# Patient Record
Sex: FEMALE | Race: WHITE | NOT HISPANIC OR LATINO | URBAN - METROPOLITAN AREA
[De-identification: names, ages, dates, MRNs, and addresses within clinical notes are randomized per-mention and may not be internally consistent; named-entity substitution may affect disease eponyms.]

---

## 2023-04-04 ENCOUNTER — APPOINTMENT (OUTPATIENT)
Dept: URBAN - METROPOLITAN AREA CLINIC 204 | Age: 78
Setting detail: DERMATOLOGY
End: 2023-04-04

## 2023-04-04 DIAGNOSIS — L82.1 OTHER SEBORRHEIC KERATOSIS: ICD-10-CM

## 2023-04-04 DIAGNOSIS — D18.0 HEMANGIOMA: ICD-10-CM

## 2023-04-04 PROBLEM — D23.39 OTHER BENIGN NEOPLASM OF SKIN OF OTHER PARTS OF FACE: Status: ACTIVE | Noted: 2023-04-04

## 2023-04-04 PROBLEM — D23.71 OTHER BENIGN NEOPLASM OF SKIN OF RIGHT LOWER LIMB, INCLUDING HIP: Status: ACTIVE | Noted: 2023-04-04

## 2023-04-04 PROBLEM — D23.72 OTHER BENIGN NEOPLASM OF SKIN OF LEFT LOWER LIMB, INCLUDING HIP: Status: ACTIVE | Noted: 2023-04-04

## 2023-04-04 PROBLEM — D23.62 OTHER BENIGN NEOPLASM OF SKIN OF LEFT UPPER LIMB, INCLUDING SHOULDER: Status: ACTIVE | Noted: 2023-04-04

## 2023-04-04 PROBLEM — D23.5 OTHER BENIGN NEOPLASM OF SKIN OF TRUNK: Status: ACTIVE | Noted: 2023-04-04

## 2023-04-04 PROBLEM — D18.01 HEMANGIOMA OF SKIN AND SUBCUTANEOUS TISSUE: Status: ACTIVE | Noted: 2023-04-04

## 2023-04-04 PROBLEM — D23.61 OTHER BENIGN NEOPLASM OF SKIN OF RIGHT UPPER LIMB, INCLUDING SHOULDER: Status: ACTIVE | Noted: 2023-04-04

## 2023-04-04 PROCEDURE — OTHER MIPS QUALITY: OTHER

## 2023-04-04 PROCEDURE — OTHER COUNSELING: OTHER

## 2023-04-04 PROCEDURE — 99203 OFFICE O/P NEW LOW 30 MIN: CPT

## 2023-04-04 ASSESSMENT — LOCATION DETAILED DESCRIPTION DERM
LOCATION DETAILED: LEFT INFERIOR UPPER BACK
LOCATION DETAILED: RIGHT MEDIAL SUPERIOR CHEST
LOCATION DETAILED: LEFT SUPERIOR MEDIAL MIDBACK
LOCATION DETAILED: EPIGASTRIC SKIN
LOCATION DETAILED: RIGHT ANTERIOR PROXIMAL UPPER ARM

## 2023-04-04 ASSESSMENT — LOCATION ZONE DERM
LOCATION ZONE: ARM
LOCATION ZONE: TRUNK

## 2023-04-04 ASSESSMENT — LOCATION SIMPLE DESCRIPTION DERM
LOCATION SIMPLE: LEFT LOWER BACK
LOCATION SIMPLE: RIGHT UPPER ARM
LOCATION SIMPLE: ABDOMEN
LOCATION SIMPLE: CHEST
LOCATION SIMPLE: LEFT UPPER BACK

## 2023-04-04 NOTE — PROCEDURE: MIPS QUALITY
Quality 110: Preventive Care And Screening: Influenza Immunization: Influenza Immunization not Administered because Patient Refused.
Quality 47: Advance Care Plan: Advance Care Planning discussed and documented; advance care plan or surrogate decision maker documented in the medical record.
Quality 111:Pneumonia Vaccination Status For Older Adults: Pneumococcal vaccine (PPSV23) was not administered on or after patient’s 60th birthday and before the end of the measurement period, reason not otherwise specified
Quality 226: Preventive Care And Screening: Tobacco Use: Screening And Cessation Intervention: Patient screened for tobacco use and is an ex/non-smoker
Detail Level: Detailed
Quality 130: Documentation Of Current Medications In The Medical Record: Current Medications Documented

## 2024-12-23 ENCOUNTER — OFFICE VISIT (OUTPATIENT)
Dept: URGENT CARE | Facility: CLINIC | Age: 79
End: 2024-12-23
Payer: MEDICARE

## 2024-12-23 VITALS
SYSTOLIC BLOOD PRESSURE: 135 MMHG | OXYGEN SATURATION: 96 % | DIASTOLIC BLOOD PRESSURE: 84 MMHG | HEART RATE: 104 BPM | RESPIRATION RATE: 12 BRPM | TEMPERATURE: 97.9 F

## 2024-12-23 DIAGNOSIS — J06.9 ACUTE URI: Primary | ICD-10-CM

## 2024-12-23 DIAGNOSIS — J18.0 BRONCHOPNEUMONIA: ICD-10-CM

## 2024-12-23 LAB
POC CORONAVIRUS 2019 BY PCR (COV19): NOT DETECTED
POC FLU A RESULT: NOT DETECTED
POC FLU B RESULT: NOT DETECTED
POC RSV PCR: NOT DETECTED

## 2024-12-23 PROCEDURE — 99212 OFFICE O/P EST SF 10 MIN: CPT | Performed by: PHYSICIAN ASSISTANT

## 2024-12-23 RX ORDER — AZITHROMYCIN 250 MG/1
TABLET, FILM COATED ORAL
Qty: 6 TABLET | Refills: 0 | Status: SHIPPED | OUTPATIENT
Start: 2024-12-23 | End: 2024-12-28

## 2024-12-23 ASSESSMENT — VISUAL ACUITY: OU: 1

## 2024-12-23 NOTE — PROGRESS NOTES
[facility] URGENT CARE   MENDEZ NOTE:      Name: Berna Caputo, 79 y.o.    CSN:[id number]   MRN:[id number]    PCP: No primary care provider on file.    ALL:    Allergies   Allergen Reactions    Doxycycline Other     Other reaction(s): Other (See Comments)   Stops normal function of bladder    Stops normal function of bladder    Levofloxacin Other     Other reaction(s): Other (See Comments), Other: See Comments   Tendon pain   Tendon pain   Other reaction(s): Other: See Comments   Tendon pain    Tendon pain   Other reaction(s): Other: See Comments   Tendon pain    Tendon pain    Amoxicillin-Pot Clavulanate Other     Other reaction(s): Other (See Comments), Other: See Comments   Inablility to urinate   Inablility to urinate      Other reaction(s): Other: See Comments   Inablility to urinate    Inablility to urinate      Other reaction(s): Other: See Comments   Inablility to urinate    Inablility to urinate    Azithromycin Other and Palpitations     Other reaction(s): Other: See Comments   Other reaction(s): Other: See Comments    Other reaction(s): Other: See Comments       History:    Chief Complaint: Cough and chest congestion (X 1 week)    Encounter Date: 12/23/2024      HPI: The history was obtained from the patient. Berna is a 79 y.o. female, who presents with a chief complaint of Cough and chest congestion (X 1 week) mentions she went out with her 9-year-old sister shopping last Monday and since then has had symptoms of congestion coughing generalized malaise and fatigue.  Denies any notable chest pain, increased work of breathing, diarrhea vomiting, or abdominal discomfort.    PMHx:    Past Medical History:   Diagnosis Date    Common variable immunodeficiency     Long-term current use of intravenous immunoglobulin (IVIG)               Current Outpatient Medications   Medication Sig Dispense Refill    azithromycin (Zithromax) 250 mg tablet Take 2 tablets (500 mg) on  Day 1,  followed by 1  tablet (250 mg) once daily on Days 2 through 5. 6 tablet 0     No current facility-administered medications for this visit.         PMSx:    Past Surgical History:   Procedure Laterality Date    BLADDER REPAIR      HYSTERECTOMY      TOTAL KNEE ARTHROPLASTY         Fam Hx: No family history on file.    SOC. Hx:     Social History     Socioeconomic History    Marital status: Single     Spouse name: Not on file    Number of children: Not on file    Years of education: Not on file    Highest education level: Not on file   Occupational History    Not on file   Tobacco Use    Smoking status: Never    Smokeless tobacco: Never   Vaping Use    Vaping status: Never Used   Substance and Sexual Activity    Alcohol use: Not Currently    Drug use: Never    Sexual activity: Not Currently   Other Topics Concern    Not on file   Social History Narrative    Not on file     Social Drivers of Health     Financial Resource Strain: Low Risk  (12/4/2024)    Received from [facility]    Overall Financial Resource Strain (CARDIA)     Difficulty of Paying Living Expenses: Not very hard   Food Insecurity: No Food Insecurity (12/4/2024)    Received from [facility]    Hunger Vital Sign     Worried About Running Out of Food in the Last Year: Never true     Ran Out of Food in the Last Year: Never true   Transportation Needs: No Transportation Needs (12/4/2024)    Received from [facility]    PRAPARE - Transportation     Lack of Transportation (Medical): No     Lack of Transportation (Non-Medical): No   Physical Activity: Not on file   Stress: Not on file   Social Connections: Unknown (5/31/2022)    Received from [facility]    Social Connection and Isolation Panel [NHANES]     Frequency of Communication with Friends and Family: Not on file     Frequency of Social Gatherings with Friends and Family: Once a week     Attends Pentecostalism Services: Not on file     Active Member of Clubs or Organizations: Not on file     Attends Club or Organization  Meetings: Not on file     Marital Status: Not on file   Intimate Partner Violence: Not on file   Housing Stability: Not on file         Vitals:    12/23/24 1108   BP: 135/84   Pulse: 104   Resp: 12   Temp: 36.6 °C (97.9 °F)   SpO2: 96%                Physical Exam  Vitals reviewed.   Constitutional:       Appearance: Normal appearance. She is normal weight.   HENT:      Head: Normocephalic and atraumatic.      Nose: Mucosal edema, congestion and rhinorrhea present. Rhinorrhea is clear.      Mouth/Throat:      Lips: Pink.      Mouth: Mucous membranes are moist.      Comments: Torus palatinus and some multiple palatal adalid, likely consistent with her autoimmune hx. This being the first time I've seen her not able to assess, note no record at least when queried in Epic search    Eyes:      General: Lids are normal. Vision grossly intact.      Extraocular Movements: Extraocular movements intact.   Cardiovascular:      Rate and Rhythm: Normal rate and regular rhythm.   Pulmonary:      Effort: Pulmonary effort is normal.      Breath sounds: Normal breath sounds.   Abdominal:      General: Abdomen is flat.   Musculoskeletal:         General: Normal range of motion.      Cervical back: Normal range of motion and neck supple.   Skin:     General: Skin is warm.      Capillary Refill: Capillary refill takes less than 2 seconds.   Neurological:      Mental Status: She is alert and oriented to person, place, and time.   Psychiatric:         Behavior: Behavior normal.           LABORATORY @ RADIOLOGICAL IMAGING (if done):     Results for orders placed or performed in visit on 12/23/24 (from the past 24 hours)   POCT SARS-COV-2/FLU/RSV PCR SYMPTOMATIC manually resulted   Result Value Ref Range    POC Coronavirus 2019, PCR Not Detected Not Detected    POC Flu A Result Not Detected Not Detected    POC Flu B Result Not Detected Not Detected    POC RSV PCR Not Detected Not Detected        ____________________________________________________________________    I did personally review Berna's past medical history, surgical history, social history, as well as family history (when relevant).  In this case, I also oversaw the her drug management by reviewing her medication list, allergy list, as well as the medications that I prescribed during the UC course and/or recommended as an out-patient (including possible OTC medications such as acetaminophen, NSAIDs , etc).    After reviewing the items above, I did look at previous medical documentation, such as recent hospitalizations, office visits, and/or recent consultations with PCP/specialist.                          SDOH:   Another factor that I considered in Berna's care was her Social Determinants of Health (SDOH). During this UC encounter, she did not have social determinants of health. Those SDOH influencing Berna's care are: none      _____________________________________________________________________      UC COURSE/MEDICAL DECISION MAKING:    Berna is a 79 y.o., who presents with a working diagnosis of   1. Acute URI    2. Bronchopneumonia     with a differential to include: Influenza, parainfluenza, rhinovirus, adenovirus, metapneumovirus, coronavirus, COVID-19, postnasal drip, strep pharyngitis, GERD, retropharyngeal abscess, tonsillitis, adenitis, seasonal allergies      Chidi treatment plan at bedside, patient has this immunodeficiency syndrome which although stable with IVIG may benefit from additional coverage with an antibiotic, she was reassured and discharged.    Marcial Mercado PA-C   Advanced Practice Provider  [facility] URG[facility]    Please note: While the patient may or may not have received printed discharge paperwork, all relevant medical findings, test results, and treatment details are accessible through the electronic medical record system. The patient is encouraged to review their chart via the  patient portal for comprehensive information and follow-up instructions.

## 2024-12-23 NOTE — PATIENT INSTRUCTIONS
Currently awaiting the results of your viral swab, will call with results within 25 minutes.    Managing Symptoms of Upper Respiratory Infections (URI) for Adults  You can expect the symptoms of your cold or upper respiratory infection to last 14 to 21 days.A dry hacking cough may continue up to three or four weeks. To help you recover:  Drink more fluids.  Get enough rest.  Use a humidifier or increase time in a steamy shower.  Keep in mind that green or yellow secretion do not equal bacterial infection.  Additional recommendations for managing your symptoms:  Fever, headache, or pain  Acetaminophen (Tylenol™) 325 mg 2 tablets every 6 hours as needed for the first 5-7 days of infection.  Acetaminophen (Tylenol™) 500 mg, 2 tablets every 8 hours as needed for the first 5-7 days of infection.   Maximum dose: 3000 mg of acetaminophen in 24 hours.   Avoid combination products that contain acetaminophen (read the label) while taking scheduled  acetaminophen.   Use lowest effective dose for the shortest possible duration to reduce the risk of serious adverse effects.    ? Ibuprofen (Advil™, Motrin™) 200 mg, 3 tablets every 6 hours-8 hours.   Avoid ibuprofen if you have kidney disease, coronary heart disease, heart failure, or history of a gastric  ulcer or gastric surgery   Maximum dose: 2400 mg of ibuprofen in 24 hours.   Use lowest needed dose for the shortest possible time frame to reduce the risk of serious side effects.  Do not use longer than 7 days, unless directed by your health care provider.   Take with food to prevent getting an upset stomach.    Sore throat  ? Take acetaminophen and/or ibuprofen as above.  ? Use throat lozenges with benzocaine which help numb your sore throat (Cepacol®, chloraseptic brands).  ? Gargle with saltwater several times a day to help relieve throat pain. Mix 1/4 teaspoon (1.4 grams) of table salt  in 8 ounces (237 milliliters) of warm water. Gargle the solution and then spit it  out.    Sinus drainage, sinus/nose/ear congestion  (nose drainage, drainage in the back of the throat, sinus pressure, facial pain, nose stuffiness, ear pressure)  It is common to have nasal drainage of various colors with a viral cold or upper respiratory infection. These usually get  better with time and do not require antibiotics.  ? Saline sinus rinse - Mix and use according to directions on the product (NeilMed©, XClear©).  ? Nasal spray (Flonase®, Nasacort®) - 2 sprays per nostril once a day after a saline sinus irrigation.  ? Oxymetazoline nasal spray (Afrin®, Sinex®)   Take two or three times a day for 3 days.   Do not use longer than 5 days. After 5 days, use saline nasal spray or the saline sinus rinse  ? Sudafed (pseudoephedrine) capsules - Take every 4 to 6 hours per package instructions for sinus congestion.    Available behind the pharmacy counter.   Avoid if you have high blood pressure, heart disease or take beta blockers (atenolol, metoprolol, etc).   Do not exceed 240 mg per day.   Longer acting medications may have more side effects such restlessness and insomnia.    Cough  Avoid coughing too hard or too often. Excessive coughing may cause bronchial (tubes going to the lungs) irritation which  could cause a cough-irritate-cough cycle that can prolong the cough for weeks.  ? Honey - 1 to 2 teaspoons every 4 to 6 hours as needed.  ? Cough drops every 4 to 6 hours as needed.  ? Guaifenesin/dextromethorphan syrup - (Robitussin DM®) per package instructions.     Do not take if you are taking an antidepressant, opioid pain medication, sleeping medication, or antipsychotic medication.

## 2025-04-03 ENCOUNTER — OFFICE VISIT (OUTPATIENT)
Dept: URGENT CARE | Facility: CLINIC | Age: 80
End: 2025-04-03
Payer: MEDICARE

## 2025-04-03 VITALS
DIASTOLIC BLOOD PRESSURE: 73 MMHG | HEART RATE: 100 BPM | SYSTOLIC BLOOD PRESSURE: 111 MMHG | TEMPERATURE: 100.1 F | OXYGEN SATURATION: 96 % | RESPIRATION RATE: 18 BRPM

## 2025-04-03 DIAGNOSIS — B34.9 NONSPECIFIC SYNDROME SUGGESTIVE OF VIRAL ILLNESS: Primary | ICD-10-CM

## 2025-04-03 DIAGNOSIS — J06.9 ACUTE UPPER RESPIRATORY INFECTION, UNSPECIFIED: ICD-10-CM

## 2025-04-03 DIAGNOSIS — D84.9 IMMUNODEFICIENCY, UNSPECIFIED: ICD-10-CM

## 2025-04-03 PROCEDURE — 99212 OFFICE O/P EST SF 10 MIN: CPT | Performed by: PHYSICIAN ASSISTANT

## 2025-04-03 NOTE — PROGRESS NOTES
[facility] URGENT CARE MENDEZ NOTE:      Name: Berna Caputo, 79 y.o.    CSN:[id number]   MRN:[id number]    PCP: No primary care provider on file.    ALL:    Allergies   Allergen Reactions    Doxycycline Other     Other reaction(s): Other (See Comments)   Stops normal function of bladder    Stops normal function of bladder    Levofloxacin Other     Other reaction(s): Other (See Comments), Other: See Comments   Tendon pain   Tendon pain   Other reaction(s): Other: See Comments   Tendon pain    Tendon pain   Other reaction(s): Other: See Comments   Tendon pain    Tendon pain    Amoxicillin-Pot Clavulanate Other     Other reaction(s): Other (See Comments), Other: See Comments   Inablility to urinate   Inablility to urinate      Other reaction(s): Other: See Comments   Inablility to urinate    Inablility to urinate      Other reaction(s): Other: See Comments   Inablility to urinate    Inablility to urinate    Azithromycin Other and Palpitations     Other reaction(s): Other: See Comments   Other reaction(s): Other: See Comments    Other reaction(s): Other: See Comments       History:    Chief Complaint: URI (Cough, fever/chills x 4 days )    Encounter Date: 4/3/2025  12:13 PM     HPI: The history was obtained from the patient. Berna is a 79 y.o. female, who presents with a chief complaint of URI (Cough, fever/chills x 4 days ) She states she has been taking tylenol so alleviate her symptoms. She has a PMH of CVID and bronchiectasis.     PMHx:    Past Medical History:   Diagnosis Date    Common variable immunodeficiency     Long-term current use of intravenous immunoglobulin (IVIG)               No current outpatient medications on file.     No current facility-administered medications for this visit.         PMSx:    Past Surgical History:   Procedure Laterality Date    BLADDER REPAIR      HYSTERECTOMY      TOTAL KNEE ARTHROPLASTY         Fam Hx: No family history on file.    SOC. Hx:     Social  History     Socioeconomic History    Marital status: Single     Spouse name: Not on file    Number of children: Not on file    Years of education: Not on file    Highest education level: Not on file   Occupational History    Not on file   Tobacco Use    Smoking status: Never    Smokeless tobacco: Never   Vaping Use    Vaping status: Never Used   Substance and Sexual Activity    Alcohol use: Not Currently    Drug use: Never    Sexual activity: Not Currently   Other Topics Concern    Not on file   Social History Narrative    Not on file     Social Drivers of Health     Financial Resource Strain: Low Risk  (12/4/2024)    Received from [facility]    Overall Financial Resource Strain (CARDIA)     Difficulty of Paying Living Expenses: Not very hard   Food Insecurity: No Food Insecurity (12/4/2024)    Received from [facility]    Hunger Vital Sign     Worried About Running Out of Food in the Last Year: Never true     Ran Out of Food in the Last Year: Never true   Transportation Needs: No Transportation Needs (12/4/2024)    Received from [facility]    PRAPARE - Transportation     Lack of Transportation (Medical): No     Lack of Transportation (Non-Medical): No   Physical Activity: Not on file   Stress: Not on file   Social Connections: Unknown (5/31/2022)    Received from [facility]    Social Connection and Isolation Panel [NHANES]     Frequency of Communication with Friends and Family: Not on file     Frequency of Social Gatherings with Friends and Family: Once a week     Attends Bahai Services: Not on file     Active Member of Clubs or Organizations: Not on file     Attends Club or Organization Meetings: Not on file     Marital Status: Not on file   Intimate Partner Violence: Not on file   Housing Stability: Not on file         Vitals:    04/03/25 1203   BP: 111/73   Pulse: 100   Resp: 18   Temp: 37.8 °C (100.1 °F)   SpO2: 96%                Physical Exam  Constitutional:       General: She is not in acute  distress.  HENT:      Head: Normocephalic and atraumatic.      Nose: Congestion present.   Eyes:      Extraocular Movements: Extraocular movements intact.      Pupils: Pupils are equal, round, and reactive to light.   Cardiovascular:      Rate and Rhythm: Normal rate and regular rhythm.   Pulmonary:      Effort: Pulmonary effort is normal. No respiratory distress.      Breath sounds: Normal breath sounds.   Musculoskeletal:         General: Normal range of motion.      Cervical back: Normal range of motion.   Skin:     General: Skin is warm.   Neurological:      General: No focal deficit present.      Mental Status: She is alert.   Psychiatric:         Mood and Affect: Mood normal.           LABORATORY @ RADIOLOGICAL IMAGING (if done):     Results for orders placed or performed in visit on 04/03/25 (from the past 24 hours)   POCT SARS-COV-2/FLU/RSV PCR SYMPTOMATIC manually resulted   Result Value Ref Range    POC Coronavirus 2019, PCR Not Detected Not Detected    POC Flu A Result Not Detected Not Detected    POC Flu B Result Not Detected Not Detected    POC RSV PCR Not Detected Not Detected       ____________________________________________________________________    I did personally review Berna's past medical history, surgical history, social history, as well as family history (when relevant).  In this case, I also oversaw the her drug management by reviewing her medication list, allergy list, as well as the medications that I prescribed during the UC course and/or recommended as an out-patient (including possible OTC medications such as acetaminophen, NSAIDs , etc).    After reviewing the items above, I did look at previous medical documentation, such as recent hospitalizations, office visits, and/or recent consultations with PCP/specialist.                          SDOH:   Another factor that I considered in Berna's care was her Social Determinants of Health (SDOH). During this UC encounter, she did not have  social determinants of health. Those SDOH influencing Berna's care are: none       COURSE/MEDICAL DECISION MAKING:    Berna is a 79 y.o., who presents with a working diagnosis of   1. Nonspecific syndrome suggestive of viral illness    2. Acute upper respiratory infection, unspecified     with a differential to include:   COVID  Influenza  Bacterial URI      Plan:   The patient presents with cough and congestion consistent with a viral upper respiratory infection.   Viral swab was obtained today. COVID/FLU/RSV were negative. Swab was sent for viral panel.  Supportive care includes increased hydration, rest, nasal saline, and symptomatic relief with decongestants, antihistamines, or cough suppressants as needed.   The patient was counseled on expected symptom duration and advised to monitor for red flag symptoms such as worsening dyspnea, persistent fever, or signs of secondary bacterial infection.   The patient requests not to be started on any antibiotics due to her allergies.   Follow-up is recommended if symptoms worsen or do not improve as expected.    Did speak with Dr. Haim Farrell regarding presentation; no other recommendations.     I, MORIAH Gutierrez student, helped prepare the medical record for my supervising clinician, Marcial Mercado PA-C.   GREGG Gutierrez, [facility]    Supervised by  Marcial Mercado PA-C   Advanced Practice Provider  [facility]    I was present with the MORIAH carlson who participated in the documentation of this note. I have personally seen and re-examined the patient and performed the medical decision-making components (assessment and plan of care). I have reviewed the PA student documentation and verified the findings in the note as written with additions or exceptions as stated in the body of this note.    Marcial Mercado PA-C

## 2025-04-04 ENCOUNTER — TELEPHONE (OUTPATIENT)
Dept: URGENT CARE | Facility: CLINIC | Age: 80
End: 2025-04-04
Payer: MEDICARE

## 2025-04-04 LAB
C PNEUM DNA UPPER RESP QL NAA+PROBE: NOT DETECTED
FLUAV H1 RNA NPH QL NAA+PROBE: NOT DETECTED
FLUAV H3 RNA NPH QL NAA+PROBE: NOT DETECTED
FLUAV RNA NPH QL NAA+PROBE: NOT DETECTED
FLUBV RNA NPH QL NAA+PROBE: NOT DETECTED
HADV DNA NPH QL NAA+PROBE: NOT DETECTED
HBOV DNA UPPER RESP QL NAA+PROBE: NOT DETECTED
HCOV 229E RNA UPPER RESP QL NAA+PROBE: NOT DETECTED
HCOV HKU1 RNA UPPER RESP QL NAA+PROBE: NOT DETECTED
HCOV NL63 RNA UPPER RESP QL NAA+PROBE: NOT DETECTED
HCOV OC43 RNA UPPER RESP QL NAA+PROBE: NOT DETECTED
HMPV RNA NPH QL NAA+PROBE: NOT DETECTED
HPIV1 RNA NPH QL NAA+PROBE: NOT DETECTED
HPIV2 RNA NPH QL NAA+PROBE: NOT DETECTED
HPIV3 RNA NPH QL NAA+PROBE: NOT DETECTED
HPIV4 RNA NPH QL NAA+PROBE: NOT DETECTED
M PNEUMO DNA UPPER RESP QL NAA+PROBE: NOT DETECTED
RSV A RNA NPH QL NAA+PROBE: NOT DETECTED
RSV B RNA NPH QL NAA+PROBE: NOT DETECTED
RV+EV RNA SPEC QL NAA+PROBE: NOT DETECTED
SERVICE CMNT-IMP: NORMAL

## 2025-04-04 NOTE — TELEPHONE ENCOUNTER
----- Message from Marcial Mercado sent at 4/4/2025  3:52 PM EDT -----  Please call the patient regarding her abnormal result. Viral panel is normal.     Called patient to notify of lab results.